# Patient Record
Sex: MALE | Race: WHITE
[De-identification: names, ages, dates, MRNs, and addresses within clinical notes are randomized per-mention and may not be internally consistent; named-entity substitution may affect disease eponyms.]

---

## 2019-02-04 ENCOUNTER — HOSPITAL ENCOUNTER (OUTPATIENT)
Dept: HOSPITAL 95 - PLD | Age: 74
Discharge: HOME | End: 2019-02-04
Attending: DERMATOLOGY
Payer: MEDICARE

## 2019-02-04 DIAGNOSIS — D48.5: Primary | ICD-10-CM

## 2019-02-04 DIAGNOSIS — L82.1: ICD-10-CM

## 2019-02-04 DIAGNOSIS — L57.0: ICD-10-CM

## 2019-06-04 ENCOUNTER — HOSPITAL ENCOUNTER (OUTPATIENT)
Dept: HOSPITAL 95 - LAB SHORT | Age: 74
Discharge: HOME | End: 2019-06-04
Attending: DERMATOLOGY
Payer: MEDICARE

## 2019-06-04 DIAGNOSIS — D48.5: Primary | ICD-10-CM

## 2019-06-10 ENCOUNTER — HOSPITAL ENCOUNTER (OUTPATIENT)
Dept: HOSPITAL 95 - PLD | Age: 74
End: 2019-06-10
Attending: DERMATOLOGY
Payer: MEDICARE

## 2019-06-10 DIAGNOSIS — C44.629: Primary | ICD-10-CM

## 2020-09-13 NOTE — NUR
ADMIT
PT ARRIVED TO ICU 10 AT 1825. PT ALERT BUT DROWSY, SPEECH IS SLURRED AND
QUIET. PT ANSWERING QUESTIONS APPROPRIATELY. ST IN THE 120S. LEVOPHED INFUSING
AT 5MCG/MIN, SBP 120S, LEVOPHED TURNED DOWN TO 3 AND THEN DOWN TO 1 AFTER
REPEAT SBP STILL ABOVE 100. MOLINA WITH DARK YELLOW URINE, ONLY ABOUT 30ML IN
THE BAG. PT'S WIFE AT THE BEDSIDE. CONTINUING TO MONITOR.

## 2020-09-14 NOTE — NUR
UPDATE
PT SLEEPING, WOKEN FOR REASSESSMENT, DENIES C/O. SPO2 >90% ON 1L/NC, WILL
TITRATE OFF AS ABLE. MAP 67, LEVO REMAINS OFF. HR 90'S, HAS BECOME IRREGULAR,
IS NOW CLEAR AFLUTTER. PT DENIES CP AND SOB.

## 2020-09-14 NOTE — NUR
SHIFT SUMMARY
 
AROUND 02:00 THIS AM PT. BECAME PROGRESSIVELY MORE CONFUSED, ALMOST DELIOUS.
PT. WOULD BE PULLING AT MOLINA CATHETER, PICKING AT IVs WITH HIS EYES CLOSED,
SAID "ANA," HE WOULD OPEN EYES, ASK "WHAT'S GOING ON?" AND WOULD HAVE NO
IDEA HE WAS JUST PULLING ON LINES. AT 02:45 THIS AM PLACED ON RESTRAITNS SO
PT. WOULD NOT HARM SELF, PT. OK WITH THIS DECISION. BP HAS MAINTAINED WITH A
MEAN BP GREATER THAN 60 THROUGH NIGHT, HAVE NOT RESTARTED LEVOPHED AFTER
TURNING OFF @ 23:30 LAST NIGHT. OTHER THAN SOME CONFUSION/DISORIENTATION
DURING SLEEP, NO ACUTE CHANGES OVERNIGHT. ASSESSMENT IS AS CHARTED. NO C/O
PAIN. VSS. WILL CONTINUE TO MONITOR.

## 2020-09-14 NOTE — NUR
END OF SHIFT
PT HAD UNEVENTFUL AFTERNOON, SLEPT MOST OF THE DAY BUT WAKES EASILY, REMAINS
CONFUSED BUT COOPERATIVE. WIFE IN TO VISIT FOR MAJORITY OF AFTERNOON,
ATTEMPTED TO ASSIST PT TO EAT BUT APPETITE IS POOR, PT DENIES NAUSEA OR ABD
PAIN. BP LOW, MAP >60, HR  AFLUTTER, PT DENIES CP/PRESSURE. O2 REMAINS
AT 3L/NC FOR DESAT WHILE SLEEPING, LS CTA. NO BM THIS SHIFT. JAUNDICE NOTED IN
BILATERAL EYES THIS EVENING. NON PRODUCTIVE COUGH HAS BEGAN PRODUCING SMALL
AMOUNTS OF WHITE PHLEGM. REPORT TO ONCOMING SHIFT.

## 2020-09-15 NOTE — NUR
ASSUMED CARE:
 
A&O. FEBRILE WITH TEMP 99.4. IN A FLUTTER. HR 120S. LUNG SOUNDS CLEAR. DOES
HAVE A WHEEZY PRODUCTIVE COUGH. ON 2-3L NC. CLEAR LIQUID DIET BUT HAS A POOR
APPETITE. MOLINA IN PLACE DRAINING DARK URINE WITH SEDIMENT. 18G IVS IN BILAT
ACS. LR INFUSING AT 100MLS/HR. MEPILEX ON COCCYX. HGB SLIGHTLY LOW. ORDERS ARE
IN TO COLLECT OCCULT STOOL. WIFE WAS AT BEDSIDE DURING SHIFT CHANGE. WILL
CONTINUE TO MONITOR

## 2020-09-15 NOTE — NUR
SHIFT SUMMARY: PATIENT RESTLESS, TURNING HIMSELF SIDE TO SIDE IN THE BED MOST
OF THE NIGHT.  PATIENT ORIENTED, STATES HE JUST HAS TROUBLE SLEEPING
SOMETIMES.  OXYGEN AT 4L/MIN VIA NASAL CANNULA WITH SATS >95%; PORTABLE CXR
DONE THIS AM.  IV WITH LR INFUSING AT 200CC/HR VIA PIV TO LEFT ARM.  PATIENT
REMAINS OFF LEVOPHED WITH MAP >60.  MOLINA CATH. PATENT DRAINING ADEQUATE
AMOUNTS CLEAR, YELLOW URINE.  CONTINUE CURRENT POC.  AWAITING DAY SHIFT RN FOR
HANDOFF.

## 2020-09-15 NOTE — NUR
SHIFT SUMMARY
PT HAS REMAINED ALERT AND ORIENTED TODAY. SPENT THE DAY RESTING IN BED WITH
HIS WIFE AT THE BEDSIDE FOR THE MAJORITY OF THE DAY. REMAINS IN AFLUTTER WITH
RATE IN THE 120S, BP STABLE. LUNGS CLEAR, DIM IN THE BASES. ON 2L/NC, DYSPNEIC
WITH ACTIVITY. PT TOLERATING MINIMAL AMTS OF CLEAR LIQUIDS DUE TO POOR
APPETITE. MOLINA WITH DARK YELLOW URINE WITH LOTS OF SEDIMENT. OVER 1L OUT
TODAY. STILL WAITING ON PCU BED TO BE AVAILABLE. CONTINUE TO MONITOR.

## 2020-09-15 NOTE — NUR
REASSESSMENT
PT HAS BEEN RESTING IN BED THROUGHOUT THE MORNING. HE IS ALERT AND ORIENTED,
BUT SLEEPS UNLESS HE IS BEING INTERACTED WITH. HE IS WEAK, BUT STILL ABLE TO
TURN HIMSELF FROM SIDE TO SIDE AND REPOSITIONS HIMSELF. LUNGS ARE CLEAR, BUT
DIM IN THE BASES. HE HAS STARTED COUGHING MORE THIS MORNING AND IS STARTING TO
PRODUCE A SMALL AMT OF PHLEGM, BUT HAS JUST SWALLOWED THE PHLEGM. PROVIDED PT
WITH SPECIMEN CUP IF HE PRODUCES MORE. REMAINS IN AFLUTTER WITH RATE RIGHT
AROUND 120, SBP STAYING ABOUT 140. PT TRIED TO DRINK SOME FLUIDS AT BREAKFAST,
BUT DIDN'T LIKE THEM AND DOESN'T WANT TO TRY ANYTHING FOR LUNCH. IV FLUIDS
STILL INFUSING BUT RATE REDUCED BY DR. DAUGHERTY. MOLINA DRAINING YELLOW URINE
WITH SEDIMENT IN IT. DR. DAUGHERTY GAVE OK FOR PT TO BE PCU STATUS. PT'S WIFE IS
AT THE BEDSIDE AND WAS UPDATED BY DR. DAUGHERTY AND NURSING STAFF.

## 2020-09-16 NOTE — NUR
ASSUMED CARE OF PATIENT AT APPROXIMATELY 1910 FROM LUISA CHEN RN.  PATIENT
RESPONDS TO VERBAL STIMULUS; WIFE BEDSIDE DURING BEDSIDE REPORT; REPORTS TO
STAFF THAT PATIENT NOT NORMALLY CONFUSED BUT HAS BEEN SINCE HE HAS BEEN SICK.
PATIENT WEAK; Q2H TURNS; BEDREST.  PATIENT HAS DOBHOFF FOR TUBE FEEDING;
PULLED OUT 1/2 WAY THROUGH SHIFT AND REPLACED BY CHARGE CASIMIRO MATTA; XRAY
PENDING; SOFT BILATERAL WRIST RESTAINTS IN PLACE.  AFLUTTER ON TELE W/ A RATE
'S; DAYSHIFT MD WAS NOTIFIED; BP SOFT; OXYGEN SATURATION ABOVE 90% ON
ROOM AIR.  PIV S/L.  URINARY CATH DRAINING JIMENA COLORED URINE.
PATIENT CURRENTLY RESTING IN BED; CALL LIGHT IN REACH; BED IN LOWEST
POSISTION; BED ALARM ON; WILL CONTINUE TO MONITOR AND ASSESS UNTIL END OF
SHIFT.

## 2020-09-16 NOTE — NUR
TRANSFER NOTE
RECEIVED REPORT FROM RN IN ICU; PT TO ROOM VIA BED AT 1504; 4 PERSON TRANSFER
ASSIST WITH SLIDER SHEET. PT AND SPOUSE ORIENTED TO ROOM AND CALL LIGHT. PT
ALERT; ORIENTED TO PERSON, PLACE AND TIME; CALM AND COOPERATIVE WITH CARE. PT
DENIES PAIN, SOB, CHEST PAIN AND DIZZINESS. PT SPOUSE REPORTS BLOATING AFTER
STARTING TUBE FEEDING; STOPPED FEEDING; RESIDUAL NOTED ; WILL HOLD
FEEDING AND RESTART AFTER REST. BP ON LOW SIDE; MAP >60; HR ELEVARED A
FLUTTER 120-130; DR MIGUEL NOTIIFED; NO NEW ORDERS. HOB ELEVATED AT 30 DEGREE;
BED IN LOW, CALL LIGHT WITHIN REACH. WILL CONTINUE TO MONITOR UNITL REPORT
GIVEN TO ONCOMING RN. normal...

## 2020-09-16 NOTE — NUR
LR CHANGED TO TKO, LASIX 20MG IVP GIVEN PER DR DAUGHERTY. PT TOLERATING CPAP,
RESP MUCH LESS LABORED. STOOL SAMPLE SENT FOR GUAIAC. DOBBHOFF TO BE PLACED
FOR TF/NUTRITION.

## 2020-09-16 NOTE — NUR
DOBBHOFF PLACED TO RIGHT NARE (NUMBED W LIDO JELLY). TUBE PLACED W/O
DIFFICULTY; AWAITING CHEST XRAY.

## 2020-09-16 NOTE — NUR
ATTEMPTED TO CHANGE AUREA AT 1500 AS IT WAS SATURATED, AREA REMAINED
SOFT W/O HEMATOMA. AS AUREA CAREFULLY PEELED UP, ARTERY STARTED BLEEDING
OUTWARD. IMMEDIATE MANUAL PRESSURE HELD WHICH WAS EFFECTIVE IN STOPPING BLLED.
DR TREJO CALLED AND NOTIFIED. PRESSURE HELD FOR 45MIN. AREA REMAINS SOFT.
CLEAR OCCLUSIVE DRSG PLACED ALONG W FEMSTOP; STRAPS TIGHTENED; BALLOON NOT
INFLATED. PT HAS GOOD WAVEFORM; SAT MONITOR TO RIGHT TOE.

## 2020-09-16 NOTE — NUR
SHIFT SUMMARY:
 
PT SLEPT ON AND OFF T/O NIGHT. MILDLY CONFUSED. FEBRILE-99.1. IN A FLUTTER. HR
IN THE 110S, -120S. LUNG SOUNDS MORE CLEAR. SPO2 >90% ON 2-3LNC. PT DID
FREQUENTLY TAKE NC OFF. SATS NEVER DROPPED BELOW 90%. SOME EPISODES OF APNEA
T/O NIGHT. PT'S BREATHING APPEARS LABORED AND HE EASILY GETS SOB. STRONG
PRODUCTIVE COUGH. IS ABLE TO CLEAR OWN SECRETIONS. MOLINA IN  PLACE. URINE IS
DARK AND HAS MOD AMT OF SEDIMENT. PT HAD A COUPLE BM SMEARS TH/O NIGHT. NO
FULL BMS. 18G IN LAC. LR AT 100MLS/HR. MEPILEX ON COCCYX IS C/D/I. PT IS ABLE
TO REPOSITION SELF IN BED. WILL PASS REPORT TO ONCOMING SHIFT

## 2020-09-16 NOTE — NUR
PT PLACED ON CPAP 20/10 W 2L BLEED-IN. PT SLEEPING SOUNDLY, DOES AWAKEN TO
VOICE. PT ALREADY LOOKS IMPROVED FROM EARLIER. PT'S WIFE AT BEDSIDE AND GIVEN
UPDATE.

## 2020-09-16 NOTE — NUR
CALLED PERFECTO ORTIZ TO REPORT PATIENT PULLED DOBHOFF; CONFUSION IN INCREASING;
ORDERS FOR SOFT WRIST RESTRAINTS.

## 2020-09-16 NOTE — NUR
PT DANGLED ON SIDE OF BED W OT; PT SOB W RESP HIGH 30'S, LOW 40'S. BP
ELEVATED. DR DAUGHERTY AT BEDSIDE. PT ASSISTED BACK TO FOWLERS POSITION IN BED
AND FELL ASLEEP ALMOST IMMEDIATELY. LOPRESSOR PO GIVEN; PT ABLE TO SWALLOW
SAFELY. CPAP ORDERED FOR NIGHT AND PRN. PT DOES HAVE APNEIC PERIODS DURING
SLEEP.

## 2020-09-16 NOTE — NUR
PT VERY RESTLESS IN BED THIS AM, SOMEWHAT DISHEVELED, LEADS OFF, SHEETS
TANGLED. PT RESP 30'S W DRY NON-PRODUCTIVE HACKING COUGH. LUNGS CLEAR, SATS
95% ON 2L O2.  PT SHIVERING. TEMP 99 VIA TEMP MOLINA PROBE.  PT STATED HE
NEEDED TO VOID; PT HAS CATHETER; PT FOREGETFUL BUT ORIENTED X3.  URINE DARK
JIMENA W SEDIMENT. PT IN AFLUTTER, HYPOTENSIVE W MAP>65.

## 2020-09-17 NOTE — NUR
Tye has been alert during care, and at times spontaneously awakening, but
napping frequently so far this shift.  He is oriented to person, able to state
his birthday, and that he is in " a civilian hospital".  He cannot specify
where that he is, and told me that today's date is February 25 of the year
2021.  I reminded him that his birthday was yesterday, but this didn't seem to
jog his memory.  He recognized his wife, and has been making requests
regarding his care and comfort, and able to respond to simple directions, but
at times struggles to follow more complex instructions.
Very dyspneic with minimal activity, such as repositioning in the bed with
maximum assistance, but requesting OOB to the bedside commode, which he would
likely not tolerate at all.  spO2 maintaining above 90% while on no
supplemental oxgyen. Heart rate has mostly been 110-128, at times 90 bpm
atrial fibrillation by telemetry monitoring. CPAP in use when he is sleeping.
Poor appetite, taking only small amounts of clear liquids by mouth, but tube
feeding was resumed early this afternoon after Chest xray was reported by the
radiologist by phone to have correct dobhoff placement.
His wife has been at the bedside for most of the day.
Key catheter draining alex clear urine. He has had one small loose brown
bowel movement today, and was continent using the bedpan.

## 2020-09-17 NOTE — NUR
Call to imaging department to request a read of the xray from last night 2100
to confirm dobhoff placement.  Call received back, stating results.  Dobhoff
was flushed with 30 cc water at this time, no resistance felt.  PT is awake
and talking to me and to his wife, Jazmine, who is at the bedside.  Tube feeding
was resumed, per prior orders.

## 2020-09-17 NOTE — NUR
CPAP in use while pt sleeping today while his wife was here, present in room;
during those times the wrist restraints were temporarily released while she
was present to monitor and protect the pt from pulling his lines.

## 2020-09-17 NOTE — NUR
ASSUMED CARE OF PATIENT AT APPROXIMATELY 1900 FROM CHERYL GARCIA RN.  PATIENT
RESPONDS TO VERBAL STIMULUS; CONFUSED; PULLING AT DOBHOFF, MOLINA AND GOWN;
SOFT WRIST RESTAINTS IN PLACE.  PATIENT WEAK; Q2H TURNS; BEDREST.  NO S/S OF
PAIN.  PATIENT HAS DOBHOFF FOR TUBE FEEDING; HIGH RESIDUAL; FEEDING HELD FOR
ONE HOUR; UNABLE TO TITRATE UP TO GOAL.  AFLUTTER ON TELE W/ A RATE 'S;
OXYGEN SATURATION ABOVE 90% ON ROOM AIR.  PIV S/L.  URINARY CATH DRAINING
JIMENA COLORED URINE.
PATIENT CURRENTLY RESTING IN BED; CALL LIGHT IN REACH; BED IN LOWEST
POSISTION; BED ALARM ON; WILL CONTINUE TO MONITOR AND ASSESS UNTIL END OF
SHIFT.

## 2020-09-17 NOTE — NUR
PATIENT CONFUSED T/O THE NIGHT PULLING ON DOBHOFF MULTIPLE TIMES, PULLING ON
CATHETER, PULLING GOWN DOWN AND TELE LEADS OFF.  PATIENT REPORT IT IS NOT HIS
INTENTION TO PULL THINGS.  PATIENT SLEPT MAYBE FOUR HOURS TOTAL.  HEART RATE
TRENDED DOWN 'S AT ONE POINT.  CURRENTLY 120'S.  WILL CONTINUE TO
MONITOR AND ASSESS UNTIL END OF SHIFT.

## 2020-09-18 NOTE — NUR
and pharmacist Judson Washington here.  UPdated on pt condition and Dr. Walton's plan to test for Lyme's disease.  It was suggested to check the PTT as
well as liver function due to the incalculable INR.

## 2020-09-18 NOTE — NUR
Met with pt's wife, Jazmine, again per request of nursing.  Spoke with both
nursing and Dr. Walton prior to entering pt's room.  Pt has had some bleeding
from his mouth and he had elevated HR and lower BP since the last time I
visited with Jazmine earlier today.  An US of the abd was just completed and labs
drawn.
 
Jazmine and her mother are present at the bedside.  Tye remains restless at
times, pulling covers off and on.  Family reports he appears to be having some
visual hallucinations trying to grab at things in the air.  His sister is
planning to visit tomorrow.  Nursing reports pt has had several family members
coming in today "to say goodbye."
 
Gently broached the code status discussion again with Jazmine.  Explained that
DNR does not mean that he won't get the treatments and testing that he
is currently getting.  Those treatments and tests could be continued to look
for a cause if his DNR status changed.  Explained that full code would be
those same testing and treatments with the possibility of a return to ICU if
he continues to worsen.  Jazmine verbalized understanding and states that she
needs to think about this decision overnight and that she cannot make it
today.  Jazmine reports pt's sister, Clary, is coming tomorrow and she tells
Tye in my presence that he needs "To hang on and wait for Clary to get
here."    Explained that staff will respect her wishes and continue with a
full code status at this time.  Provided a copy of [employer] [facility] [employer] to her.  Nursing, Dr. Walton and Iman in CM updated.  PC will plan to
visit with Jazmine and Tye again tomorrow morning or sooner if needed.

## 2020-09-18 NOTE — NUR
Wife remains at bedside. Noted very small amount of dark red blood drooling
from pt's mouth, mostly dried on the right side.  Not sure if there is an
active small bleed, or if his saliva is moistening the dried blood present in
his mouth. He is lying on his right side, respirations are regular, slightly
labored at times.  He awakens easily to stimuli.

## 2020-09-18 NOTE — NUR
Met with Jazmine and Tye this morning.  Tye slept through most of the
visit, however he did awaken a little and speak a few words to Jazmine.  He is
pulling at tubes and lines and wants his gown removed and to be repositioned.
Assisted bedside nurse with repositioning to his left side, which is the side
he prefers to sleep on at home per Jazmine.
 
Emotional support given to Jazmine.  She is tearful and is able to tell this
writer that the doctors have given him a poor prognosis.  She states Tye
has sisters, one who lives in [place] is planning to come tomorrow.  Jazmine
states "I don't know how much time he has left."  Allowed her to talk about
what the doctors have told her.  Offered to call someone for her as a support
person.  She reports that her mother was here earlier today and will plan to
return later.  She declines any drinks or a snack at this time.  She reports
that she was able to get some sleep last night.
 
She reports that the jaundice that Tye has is new in the past couple of
days.  He is more sleepy and appears to be confused at times.  Gently broached
the subject of code status with her as something to think about.  She reports
that she and Tye have had discussions on code status in the past but they
never talked about how sick he needed to be to draw the line to consider a DNR
status.  Informed her that there are some good resource booklets on making
hard decisions should she desire that information.  Briefly discussed the
support care he is being given at this time vs a comfort care approach.
Again, did not push the issue as Jazmine is tearful and distraught over his
illness.  Will plan to check in with Jazmine this afternoon to see how she is
doing and if she has any questions.  Encouraged self care.  Emotional support
given.  PC will continue to follow.

## 2020-09-18 NOTE — NUR
This afternoon, blood pressure improved and heart rate has remained less than
130.  He has been less responsive and much less interactive than he was
yesterday.  Shifting his position often and spontaneously in the bed.  Able to
do oral care 3 times, but only very gently and not thoroughly due to the pt's
discomfort and wanting to avoid preciptating any bleeding of his mouth.
Appears to be tolerating the tube feeding, passing gas and one very small
smear of stool this afternoon.
His wife Jazmine has remained at the bedside and will continue to do so.
She states that the pt's sister will be arriving tomorrow from [place], [place].

## 2020-09-18 NOTE — NUR
PATIENT COUGHED UP MORE BLOODYS SPUTUM.  PATIENT SLEPT MOST OF SHIFT.  HEART
RATE 110-120'S; BLOOD PRESSURE IMPROVED.  TUBE FEEDING CONTINUES.  PATIENT
TAKES CPAP MASK OF MULTPLE TIMES; DOESNT TOLERATE; APNEIC WHEN SLEEPING.
WILL CONTINUE TO MONITOR AND ASSESS UNTIL END OF SHIFT.

## 2020-09-18 NOTE — NUR
Pt's heart rate is increasing, now in the 120-130s. Spo2 93% on room air.
Wife requested that cpap stay off since it is uncomfortable for the patient,
and he is attempting to remove it, and since the "goal is to keep him
comfortable" she would prefer to keep it off.  Noted now some active bleeding
from the pt's mouth.  Family members are at the bedside, and wife states that
they are here to "say good bye" to Tye. Her understanding is that "we
don't know how much time he has left".
Call to Dr. Walton to update on pt's condition.  New order received for Vit K as
well as FFP at this time.
Metoprolol was held at noon due to pt's blood pressure being outside or
presribed parameters for administration.  Respiratory rate also noted
increased, up to 32 breaths/minute.

## 2020-09-18 NOTE — NUR
alerted by the PCT that the pt's spo2 was dropping, and continuous
oxymetry monitor alarming.  In room noted that spo2 was 87%.  CPAP placed on
pt, with 3 l/min o2 bleed in , and spo2 improved to 91%.  The pt is lying on
his left side, with his wife at the bedside.  She is tearful, and asked her
own mother to leave the room; states she just wants to be alone right now.

## 2020-09-18 NOTE — NUR
WIFE IN ROOM VERY ATTENTIVE TO PATIENT, CALMLY TALKING TO HIM AND COMFORTING.
PATIENT WAKES WITH VERBAL STIMULI, ANDSWERS WITH ONE TO TWO WORD ANSWERS,
FOLLOW SIMPLE COMMANDS. HE DENIES PAIN. HE IS BREATHING AT 32-36 BREATHS PER
MINUTE, HE BREATHS RAPIDLY FOR 20-30SECONDS AND THE IS SNORING WITH SLOWER
RESP, THIS IS CYCLICAL AND REGULAR. HIS LUNGS ARE DECREASED T/O WITH BRONCHIAL
RHONCHI. F/C DRAINING DARK YELLOW URINE, PATIENT HAS BEEN PULLING AT THE
CATHETER, ARRANGED THE SHEETS TO KEEP HIS HANDS AWAY FROM THE CATH. NO
RESTRAINTS ARE USED. DOBHOFF WITH TF AT 35CC/HOUR. ABDOMEN WITH MODERATE
DESTENTION WITH HYPOACTIVE BT. HAVING BROWN LIQUID STOOL. CALL LIGHT IN REACH
OF PATIENT AND WIFE.

## 2020-09-18 NOTE — NUR
2NS UNIT OF FFP COMPLETED.  vITAL SIGNS STABLE, PT APPEARS TO BE SLEEPING,
WITHOUT THE CPAP.  WIFE AT BEDSIDE.

## 2020-09-18 NOTE — NUR
Dr. Walton and Nilda Isbell with Palliative care here, discussing pt's
condition and ongoing treatment plan.
Oral care was given at the time of oral vitamin K adminsitration. PT spat out
large blood clot which was under his tongue.  Attempted to do some very gentle
oral care, but minimally successful due to pt's discomfort and refusal to
continue.  Wife Jazmine at the bedside.
Waiting for FFP slip to administer.

## 2020-09-18 NOTE — NUR
Wife Jazmine at bedside, tearful.  States that the doctor thinks that "it doesn't
look good".  Declines support from palliative care/spiritual care at this
time. States that family members will be coming to say their goodbyes to the
pt.  STates that he is a good man and has lived a good life.
The pt has been sleeping when left undisturbed.  He was able to take
medications by mouth this morning, but has otherwise been uninterested in oral
intake.  His tube feeding is running via Dobhoff per orders and rate increased
at this time from 25cc/hour to 35 cc/hour. He has not been communicative this
morning, only occasionally attempting to speak when spoken to but it is
difficult to understand.  Occasional dry cough noted.
Noted dried blood around his teeth and on tongue/palate.  Oral care was not
attempted as noc shift RN reported that the pt did not tolerate it well and it
caused increased bleeding from the oral cavity.  At this time there is no
actie bleeding noted.  His lips and around his mouth are being wipes gently
with soft cloth as needed.
He is lying on his right side, propped with pillows.  christianson catheter draining
alex urine.  No bowel movements this morning. Skin is slightly jaundices in
color. Breathing is even, unlabored.  He is not wearing the CPAP at this time.
It was placed on the pt at time of bedside report at 0700, but at 0830 the
pt's wife Jazmine requested that he be given a break from it as it seemed to be
making him very uncomfortable, and he was attempting to pull it off. At this
time, he is not pulling at any lines/tubes/wires.
Wife Jazmine was encouraged to keep staff informed of any needs or concers that
she might have.

## 2020-09-18 NOTE — NUR
CRITICALLY HIGH PT ABOVE 90 AND INR NO CALC; DR. NATH NOTIFIED OF LAB
RESULTS AND QUARTER SIZE BLOODY SPUTUM ABOUT AN HOUR BEFORE LABS; WILL
CONTINUE TO MONITOR; WARFARIN BEING HELD.

## 2020-09-19 NOTE — NUR
Family requested oral care as the pt was "trying to spit out a blood clot".
With use of flashlight, dried crusty blood seen on the palate, tongue, and all
teeth.  Oral care was provided, and a large clot was removed by suction with
use of mouth care kit. Some slow, small active bleeding was noted in the
mouth, so oral care was limited due to the pt's resistance to the care,
presumably due to discomfort.

## 2020-09-19 NOTE — NUR
SUMMARY
PT'S HR REMAINED TACHY -142 T/O SHIFT.  ORDERS OBTAINED FROM DR SOTO
TO INCREASE METOPROLOL, WHICH WAS ADMINISTERED. PT HAD DIFFICULTY SWALLOWING
PILLS, INSTEAD CRUSHED AND ADMINISTERED THROUGH DOBHOFF.  HAS DRIED BLOOD IN
MOUTH, GENTLE ORAL CARE WAS PERFORMED BY CHARGE RN.
PT RESTLESS AT TIMES.  MOVES
INDEPENDENTLY IN BED AND STAFF REPOSITIONED T/O DAY ALSO.  SPOUSE AT BEDSIDE,
SPOKE TO DR SOTO ABOUT OUTLOOK.  STATES WANTS TO AWAIT ARRIVAL OF PT'S
CHILDREN MONDAY MORNING BEFORE MAKING ANY DECISIONS ABOUT CHANGING CODE
STATUS.

## 2020-09-20 NOTE — NUR
SHIFT SUMMARY
PT CONTINUES TO BE RESPONSIVE TO VERBAL STIMULI, FOLLOW SIMPLE DIRECTIONS.
MOANS. OCCASIONALLY ANSWERS YES/NO QUESTIONS. TACHYPNIC. HR REMAINS 130-140'S,
AFLUTTER, NO CHANGE AFTER METOPROLOL DOSE. PIVOT 1.5 AT GOAL OF 40 ML/HR c
FLUSHES INCREASED  ML q4. 1/2 NS STARTED  ML/HR. PT CONTINUES TO
HAVE THICK BLOODY CLOTS TO MOUTH. ORAL CARE PROVIDED MULTIPLE TIMES. GI
CONSULTED TODAY. LIVER U/S ORDERED AND LACTULOSE. PT c 2 SMALL LIQUID STOOLS.
SENT TO LAB FOR GUIAC. PT'S ADULT CHILDREN WILL BE ARRIVING TONIGHT AND
DISCUSSIONS REGARDING CODE STATUS TO BE HAD. WILL CONTINUE TO MONITOR UNTIL
REPORT TO ONCOMING NURSE.

## 2020-09-20 NOTE — NUR
Met with Jazmine in pt's room this morning.  She reports that pt's children are
flying in from [place] and [place] late tonight and will be coming to visit pt.  She
states she is going to defer the code status decision to his children.  She is
hopeful that his children can meet with MD tomorrow and get their questions
answered before making a decision.  Tye remains very jaundiced at this
time.  He opened his eyes during my visit but did not verbally respond to my
questions.  PC will continue to follow and will plan to meet with Jazmine,
Tye and his children once they arrive.  Emotional support given to Jazmine.
She appears quite fatigued.  She has been here caring for Tye while he has
been here.  Encouraged self care.

## 2020-09-20 NOTE — NUR
SHIFT SUMMARY
PT RESTLESS T/O NIGHT. PT'S HR REMAINED 'S-140'S. METOPROLOL GIVEN AS
ORDERED. PT REPOSITIONED Q 2 HRS AND AS NEEDED FOR COMFORT. MOLINA DRAINING TO
GRAVITY. PT CONFUSED AND LETHARGIC AT TIMES. PT REPORTS NO CP OR PRESSURE.
BP STABLE. OXYGEN SATURATION OVER 90% ON RA. PT HAS CONTINUOUS TPN THROUGH
DOBHOFF AT 40. WILL CONTINUE TO MONITOR UNTIL REPORT GIVEN TO DAYSHIFT RN.

## 2020-09-20 NOTE — NUR
ASSUMED CARE OF PT AT 0700. REPORT FROM NATALIA/ELYSSA KIRKPATRICK. PT RESTING IN BED.
SO AT BEDSIDE. PT LAYING c EYES CLOSED. MOANS TO VERBAL STIMULI. FOLLOWS
SIMPLE DIRECTIONS. DOES NOT ANSWER QUESTIONS. PT JAUNDICE, FRAGILE, ECCHYOTIC
SKIN. ABD FIRM, ROUND, DISTENDED. HYPOACTIVE BT. DOBHOFF TO RIGHT NARE. PIVOT
1.5 INFUSING AT GOAL OF 40 ML/HR c 130 ML FLUSHES q4 HOURS. LUNGS DIMINISHED
IN BASES. PT TACHYPENIC IRREGULAR RESP. 'S. PT ABLE TO MOVE ALL
EXTREMITIES. Q2 HOUR TURNS. MOLINA PATENT AND DRAINING ORANGE CLOUDY URINE TO
GRAVITY. DRIED BLOOD IN MOUTH. ORAL CARE PROVIDED. WILL CONTINUE TO MONITOR.

## 2020-09-21 NOTE — NUR
Called by nursing to return to room as Tye's wife and both children are
present.  They have chosen to change treatment course to comfort care at this
time.  Explained comfort care and what that would look like in the hospital.
Amswered their questions.
 
Jazmine is hesitant to take Tye home on hospice.  It is very likely once
medications and therapies are withdrawn that Tye will pass away here in
the hospital.  Family is asking about a timeline.  Explained that it is
difficult to predict.  Provided emotional support to family.  Spoke with Dr. Quach and new comfort care order set ordered.  Updated nursing and Lucy Miller who states oringinally pt had  orders for discharge.
 
Comfort care cart ordered for family.  Provided family with comfort care
booklet.  PC will continue to follow.

## 2020-09-21 NOTE — NUR
Present at bedside with nursing when Dr. Mandujano spoke with pt's son and dtr.
Pt's wife had previously stepped out of room and is not present at the
bedside at this time.  Dr. Mandujano explained bone marrow biopsy results and
stated that he cannot find any evidence that pt's current health situation is
caused by cancer.  Pt's son and dtr will update Jazmine  when she returns and Dr. Mandujano stated that if Jazmine had more questions that she could call his office
and speak with him.  Sary reports that Dr. Quach had visited earlier and
told them that he would very likely not recover from this illness.  They would
like some time to visit together as a family once Jazmine returns so that they
can make a decision.  PC will follow up.

## 2020-09-21 NOTE — NUR
CRITICAL LAB VALUES
NOTFIED OF PLT COUNT AT 33, PROTIME AT 57.6 AND INR OF 5.9 AT 0425. PHYSICIAN
NOTIFIED AT 0432. NO PHYSICIAN ORDERS WERE GIVEN PERTAINING TO THESE LAB
RESULTS.

## 2020-09-21 NOTE — NUR
ASSUMED CARE AT 0700, REPORT FROM CRYSTAL. LAYING SUPINE IN BED IN LOW
FOWLERS. MOVES ALL FOUR EXTREMETIES, AWAKES TO VERBAL STIMULI, NON VERBAL AT
THIS TIME. TPN INFUSING AT 40ML/HR. 0.45% NS INFUSING AT 50ML/HR. MOLINA IN
PLACE DRAINING ORANGE COLORED URINE TO GRAVITY. SKIN AND SCLERA JAUNDICE,
DOBHOFF IN PLACE, RA AT THIS TIME WITH INCREASED RR OF 38. INTERMITANT
TACHYPNIA DURING THE NIGHT PER NIGHT NOC RN. AWAITING FAMILY ATTIVAL AT 0800
FOR CONFERENCE WITH PALLATIVE CARE TO DETERMINE FURTHER COARSE OF TREATMENT.
SPOUSE AT BEDSIDE, WILL CONTINUE TO MONITOR.

## 2020-09-21 NOTE — NUR
SHIFT SUMMARY;
 
CHANGED TO COMFORT CARE TODAY. FAMILY AT BEDSIDE THROUGHOUT DAY. MEDICATED
PER ORDERS FOR COMFORT. TELEMETRY REMOVED, IV FLUIDS DC'D, DOBHOFF REMOVED.
MOLINA REMAINS IN PLACE DRAINING ORANGE URINE. REPOSITIONED FOR COMFORT
THROUGHOUT DAY AS NEEDED. ORAL CARE PROVIDED. WILL CONTINUE TO MONITOR AND
TREAT UNTIL CHANGE OF SHIFT.

## 2020-09-21 NOTE — NUR
Met with pt's son, Jaime, dtr, Sary, and Jazmine this morning in pt's room.  Dr. Arroyo made rounds during my visit.  Pt is currently experiencing multiple
system organ failure likely caused by his sepsis.  He is a little more alert
this morning according to his wife.  He is answering questions with simple one
word phrases in between sleeping off and on during the visit.  Updated Jazmine
and his children on current condition and treatments.  Discussed options for
care and code status.  Discussed likelihood for continued decline and the
possibility of a lengthy recovery and new baseline if he were to somehow
survive this event. They would like to speak with Dr. Quach and Dr. Mandujano prior to making any decisions.  Pt saw Dr. Mandujano about 2 weeks ago for a
bone marrow biopsy and they are interested in receiving those results.
 
Tye remains very jaundiced and pulls at lines and tubes at times.  Gentle
oral care done as to not disrupt any blood clots that have formed and cause
new bleeding.  Answered family's questions.  Will await visits from Dr. Quach and Dr. Mandujano and check back in with family to see if they have any
questions.  Gently urged them that they need to make a decision re: code
status and paln of care sooner rather than later.  Nursing updated.  PC will
check in with pt's family around noon.

## 2020-09-21 NOTE — NUR
SHIFT SUMMARY
PT RESTLESS T/O SHIFT. PT LETHARGIC AND MOANS IN RESPONSE TO VERBAL STIMULI.
PT'S WIFE AT BEDSIDE. PT'S HR REMAINS IN 'S 'S. MEDICATIONS GIVEN
PER EMAR. PT'S BP STABLE. OXYGEN SATURATION REMAINED ABOVE 92% ON RA. DOBHOFF
RUNNING AT 40 WITH TPN CONTINUOUSLY. MOLINA PATENT AND TO GRAVITY DRAIN. PT
REPOSITIONED Q 2 HRS AND AS NEEDED FOR COMFORT. FAMILY PLAN TO MEET WITH
PALLIATIVE CARE RN THIS AM. WILL CONTINUE TO MONITOR UNTIL REPORT GIVEN TO
DAYSHIFT RN.

## 2020-09-21 NOTE — NUR
Spoke with Bedside CASIMIRO Moura and discussed case. Zeny reports family
expressing concerns regarding Pt's breathing. Roxanol and Ativan given
approximately 1 hour ago.
 
Pt resting in bed with his eyes closed. Respiratory rate increase and work of
breathing slighly increase. Family at bedside. Offered family to express
concerns and ask questions. Family reports reading book that was given with
S/S Pt may experience as normal dying process. Family reports no questions at
this time.
 
Spoke with Bedside CASIMIRO Moura and reviewed comfort medications. Zeny will offer
Roxanol and medication for secretions.
 
Palliative Care will remain avilable.

## 2020-09-22 NOTE — NUR
PT TO  HOME
PT LEFT FACILITY TO [facility] WITH  SERVICE
REPRESENTATIVE AT 0325. PT HAD NO BELONGINGS AT BEDSIDE OR SENT WITH 
, BELONGINGS SENT HOME WITH WIFE.

## 2020-09-22 NOTE — NUR
UPDATE/TIME OF DEATH
PT'S TIME OF DEATH WAS 0040, VERIFIED WITH CASIMIRO JOHNSON. WIFE WAS AT BEDSIDE. WIFE
GIVEN PRIVACY WITH PT. WIFE DECLINED  SERVICES. PT NOT A CANDIDATE FOR
[employer]. WIFE GIVEN OPTIONS FOR  HOMES AND CHOSE [facility] THE
[facility] IN [place]. WIFE LEFT TO HOME. PT WAS CLEANED, IV REMOVED, MOLINA
CATHETER REMOVED AND NEW GOWN PLACED ON PT. THE  HOME HAS BEEN
NOTIFIED.
